# Patient Record
Sex: FEMALE | Race: WHITE | NOT HISPANIC OR LATINO | Employment: OTHER | URBAN - METROPOLITAN AREA
[De-identification: names, ages, dates, MRNs, and addresses within clinical notes are randomized per-mention and may not be internally consistent; named-entity substitution may affect disease eponyms.]

---

## 2017-05-13 ENCOUNTER — HOSPITAL ENCOUNTER (OUTPATIENT)
Dept: RADIOLOGY | Facility: MEDICAL CENTER | Age: 52
End: 2017-05-13
Attending: INTERNAL MEDICINE
Payer: MEDICAID

## 2017-05-13 DIAGNOSIS — R17 JAUNDICE, UNSPECIFIED, NOT OF NEWBORN: ICD-10-CM

## 2017-05-13 PROCEDURE — 700117 HCHG RX CONTRAST REV CODE 255: Performed by: INTERNAL MEDICINE

## 2017-05-13 PROCEDURE — 74177 CT ABD & PELVIS W/CONTRAST: CPT

## 2017-05-13 RX ADMIN — IOHEXOL 100 ML: 350 INJECTION, SOLUTION INTRAVENOUS at 10:18

## 2017-05-30 ENCOUNTER — HOSPITAL ENCOUNTER (OUTPATIENT)
Dept: RADIOLOGY | Facility: MEDICAL CENTER | Age: 52
End: 2017-05-30
Attending: INTERNAL MEDICINE
Payer: MEDICAID

## 2017-05-30 DIAGNOSIS — R17 JAUNDICE, UNSPECIFIED, NOT OF NEWBORN: ICD-10-CM

## 2017-05-30 DIAGNOSIS — R93.89 ABNORMAL CAT SCAN: ICD-10-CM

## 2017-05-30 PROCEDURE — 74183 MRI ABD W/O CNTR FLWD CNTR: CPT

## 2017-05-30 PROCEDURE — A9579 GAD-BASE MR CONTRAST NOS,1ML: HCPCS | Performed by: INTERNAL MEDICINE

## 2017-05-30 PROCEDURE — 700117 HCHG RX CONTRAST REV CODE 255: Performed by: INTERNAL MEDICINE

## 2017-05-30 RX ADMIN — GADODIAMIDE 13 ML: 287 INJECTION INTRAVENOUS at 15:50

## 2017-07-13 ENCOUNTER — HOSPITAL ENCOUNTER (OUTPATIENT)
Dept: LAB | Facility: MEDICAL CENTER | Age: 52
End: 2017-07-13
Attending: INTERNAL MEDICINE
Payer: MEDICAID

## 2017-07-13 PROCEDURE — 87799 DETECT AGENT NOS DNA QUANT: CPT

## 2017-07-13 PROCEDURE — 36415 COLL VENOUS BLD VENIPUNCTURE: CPT

## 2017-07-18 LAB — MISCELLANEOUS LAB RESULT MISCLAB: NORMAL

## 2018-01-20 ENCOUNTER — HOSPITAL ENCOUNTER (OUTPATIENT)
Dept: RADIOLOGY | Facility: MEDICAL CENTER | Age: 53
End: 2018-01-20
Attending: INTERNAL MEDICINE
Payer: MEDICAID

## 2018-01-20 DIAGNOSIS — K75.4 CHRONIC AGGRESSIVE HEPATITIS (HCC): ICD-10-CM

## 2018-01-20 PROCEDURE — 76705 ECHO EXAM OF ABDOMEN: CPT

## 2019-02-18 ENCOUNTER — APPOINTMENT (OUTPATIENT)
Dept: RADIOLOGY | Facility: IMAGING CENTER | Age: 54
End: 2019-02-18
Attending: NURSE PRACTITIONER
Payer: MEDICAID

## 2019-02-18 ENCOUNTER — OFFICE VISIT (OUTPATIENT)
Dept: URGENT CARE | Facility: CLINIC | Age: 54
End: 2019-02-18
Payer: MEDICAID

## 2019-02-18 VITALS
HEIGHT: 64 IN | TEMPERATURE: 98.3 F | SYSTOLIC BLOOD PRESSURE: 100 MMHG | OXYGEN SATURATION: 95 % | BODY MASS INDEX: 20.08 KG/M2 | HEART RATE: 91 BPM | WEIGHT: 117.6 LBS | RESPIRATION RATE: 14 BRPM | DIASTOLIC BLOOD PRESSURE: 62 MMHG

## 2019-02-18 DIAGNOSIS — S52.522A: Primary | ICD-10-CM

## 2019-02-18 DIAGNOSIS — S63.502A SPRAIN OF LEFT WRIST, INITIAL ENCOUNTER: ICD-10-CM

## 2019-02-18 PROCEDURE — 99203 OFFICE O/P NEW LOW 30 MIN: CPT | Performed by: NURSE PRACTITIONER

## 2019-02-18 PROCEDURE — 73110 X-RAY EXAM OF WRIST: CPT | Mod: TC,LT | Performed by: NURSE PRACTITIONER

## 2019-02-18 RX ORDER — OMEPRAZOLE 20 MG/1
20 CAPSULE, DELAYED RELEASE ORAL DAILY
COMMUNITY

## 2019-02-18 RX ORDER — MYCOPHENOLATE MOFETIL 500 MG/1
500 TABLET ORAL 2 TIMES DAILY
COMMUNITY
End: 2020-10-26

## 2019-02-18 RX ORDER — URSODIOL 300 MG/1
300 CAPSULE ORAL 2 TIMES DAILY
COMMUNITY

## 2019-02-18 RX ORDER — SODIUM PHOSPHATE,MONO-DIBASIC 19G-7G/118
500 ENEMA (ML) RECTAL
COMMUNITY
End: 2020-03-25

## 2019-02-18 ASSESSMENT — ENCOUNTER SYMPTOMS
WEAKNESS: 0
TINGLING: 0
NUMBNESS: 0
DIZZINESS: 0
PSYCHIATRIC NEGATIVE: 1
CONSTITUTIONAL NEGATIVE: 1
RESPIRATORY NEGATIVE: 1
MUSCLE WEAKNESS: 0
CARDIOVASCULAR NEGATIVE: 1
SENSORY CHANGE: 0
SHORTNESS OF BREATH: 0
NEUROLOGICAL NEGATIVE: 1
MUSCULOSKELETAL NEGATIVE: 1
FOCAL WEAKNESS: 0
GASTROINTESTINAL NEGATIVE: 1
EYES NEGATIVE: 1

## 2019-02-19 NOTE — PROGRESS NOTES
"Subjective:     Luanne JOSE is a 53 y.o. female who presents for Wrist Injury (fall today LT wrist injury)       Wrist Injury    Incident onset: earlier this morning. Incident location: outdoors. The pain is present in the left wrist. The quality of the pain is described as aching. The pain is moderate. Pertinent negatives include no chest pain, muscle weakness, numbness or tingling. The symptoms are aggravated by movement. She has tried acetaminophen for the symptoms.   Pt was walking dog when she fell backwards onto her left wrist on the snow.    PMH:  has no past medical history on file.    MEDS:   Current Outpatient Prescriptions:   •  mycophenolate (CELLCEPT) 500 MG tablet, Take 500 mg by mouth 2 times a day., Disp: , Rfl:   •  omeprazole (PRILOSEC) 20 MG delayed-release capsule, Take 20 mg by mouth every day., Disp: , Rfl:   •  Mesalamine (APRISO PO), Take  by mouth., Disp: , Rfl:   •  ursodiol (ACTIGALL) 300 MG Cap, Take 300 mg by mouth 2 times a day., Disp: , Rfl:   •  Probiotic Product (PROBIOTIC DAILY PO), Take  by mouth., Disp: , Rfl:   •  glucosamine Sulfate 500 MG Cap, Take 500 mg by mouth 3 times a day, with meals., Disp: , Rfl:   •  potassium citrate SR (UROCIT-K SR) 10 MEQ (1080 MG) TBCR, Take 20 mEq by mouth 2 Times a Day.  , Disp: , Rfl:   •  drospirenone-ethinyl estradiol (LASHAWN) 3-0.03 MG per tablet, Take 1 Tab by mouth every day.  , Disp: , Rfl:   •  NON SPECIFIED, Take 1 Tab by mouth every day. \"ESTROGEN MAX STRENGTH.\" , Disp: , Rfl:   •  MULTIPLE VITAMIN PO, Take 1 Tab by mouth every day.  , Disp: , Rfl:   •  BIOTIN, Take 500 mg by mouth every day.  , Disp: , Rfl:     ALLERGIES: No Known Allergies    SURGHX: No past surgical history on file.    SOCHX:  reports that she has never smoked. She has never used smokeless tobacco.     FH: Reviewed with patient, not pertinent to this visit.     Review of Systems   Constitutional: Negative.  Negative for malaise/fatigue.   HENT: Negative.    Eyes: " "Negative.    Respiratory: Negative.  Negative for shortness of breath.    Cardiovascular: Negative.  Negative for chest pain.   Gastrointestinal: Negative.    Genitourinary: Negative.    Musculoskeletal: Negative.         Left wrist pain   Skin: Negative.  Negative for rash.   Neurological: Negative.  Negative for dizziness, tingling, sensory change, focal weakness, weakness and numbness.   Psychiatric/Behavioral: Negative.    All other systems reviewed and are negative.    Objective:     /62 (BP Location: Right arm, Patient Position: Sitting, BP Cuff Size: Adult)   Pulse 91   Temp 36.8 °C (98.3 °F)   Resp 14   Ht 1.626 m (5' 4\")   Wt 53.3 kg (117 lb 9.6 oz)   SpO2 95%   BMI 20.19 kg/m²     Physical Exam   Constitutional: She is oriented to person, place, and time. She appears well-developed and well-nourished. She is cooperative. No distress.   HENT:   Head: Normocephalic.   Right Ear: External ear normal.   Left Ear: External ear normal.   Nose: Nose normal.   Eyes: Conjunctivae, EOM and lids are normal.   Neck: Normal range of motion.   Cardiovascular: Normal rate, regular rhythm, normal heart sounds and normal pulses.    Pulmonary/Chest: Effort normal and breath sounds normal. No respiratory distress.   Abdominal: Soft. Bowel sounds are normal.   Musculoskeletal: She exhibits no deformity.        Left elbow: Normal. She exhibits normal range of motion. No tenderness found.        Left wrist: She exhibits decreased range of motion (due to pain), tenderness and swelling. She exhibits no deformity and no laceration.        Left forearm: Normal. She exhibits no tenderness.   Neurological: She is alert and oriented to person, place, and time. She has normal strength. No sensory deficit.   Skin: Skin is warm, dry and intact. Capillary refill takes less than 2 seconds.   Psychiatric: She has a normal mood and affect.   Vitals reviewed.    X-ray left wrist:    Narrative     2/18/2019 7:07 " PM    HISTORY/REASON FOR EXAM: Left wrist pain after fall on ice this a.m.      TECHNIQUE/EXAM DESCRIPTION AND NUMBER OF VIEWS:  4 views of the LEFT wrist.    COMPARISON: None    FINDINGS:  There is an acute appearing nondisplaced transverse fracture of the distal metaphysis of the left radius.  No articular communication is identified.  No carpal fracture or dislocation is present.  The navicular appears intact.  If navicular pain is present and persists reevaluation in 7 to 10 days is recommended.     Impression     Nondisplaced transverse fracture distal left radial metaphysis.     Reading Provider Reading Date   Rigoberto Shine M.D. Feb 18, 2019        Assessment/Plan:     1. Traumatic closed nondisplaced torus fracture of distal radial metaphysis, left, initial encounter  - DX-WRIST-COMPLETE 3+ LEFT; Future  - REFERRAL TO SPORTS MEDICINE    Discussed RICE and acetaminophen PRN for pain. Pt unable to take NSAIDs due to medical issue. Wrist splint provided for left wrist. Referral given for sports medicine.    Patient advised to: Return for 1) Symptoms don't improve or worsen, or go to ER, 2) Follow up with primary care in 7-10 days.    Differential diagnosis, natural history, supportive care, and indications for immediate follow-up discussed. All questions answered. Patient agrees with the plan of care.

## 2019-02-20 ENCOUNTER — APPOINTMENT (OUTPATIENT)
Dept: RADIOLOGY | Facility: MEDICAL CENTER | Age: 54
End: 2019-02-20
Attending: INTERNAL MEDICINE

## 2019-02-28 DIAGNOSIS — S52.522A: Primary | ICD-10-CM

## 2020-02-28 ENCOUNTER — TELEPHONE (OUTPATIENT)
Dept: SURGERY | Facility: MEDICAL CENTER | Age: 55
End: 2020-02-28

## 2020-02-28 DIAGNOSIS — K75.4 AUTOIMMUNE HEPATITIS (HCC): ICD-10-CM

## 2020-03-04 ENCOUNTER — APPOINTMENT (OUTPATIENT)
Dept: TRANSPLANT | Facility: MEDICAL CENTER | Age: 55
End: 2020-03-04
Payer: MEDICAID

## 2020-03-25 ENCOUNTER — TELEMEDICINE2 (OUTPATIENT)
Dept: TRANSPLANT | Facility: MEDICAL CENTER | Age: 55
End: 2020-03-25
Payer: MEDICAID

## 2020-03-25 ENCOUNTER — TELEPHONE (OUTPATIENT)
Dept: TRANSPLANT | Facility: MEDICAL CENTER | Age: 55
End: 2020-03-25

## 2020-03-25 DIAGNOSIS — K90.0 CELIAC DISEASE: ICD-10-CM

## 2020-03-25 DIAGNOSIS — K59.1 FUNCTIONAL DIARRHEA: ICD-10-CM

## 2020-03-25 DIAGNOSIS — D84.9 IMMUNOSUPPRESSION (HCC): ICD-10-CM

## 2020-03-25 DIAGNOSIS — K75.4 AUTOIMMUNE HEPATITIS (HCC): ICD-10-CM

## 2020-03-25 PROBLEM — K21.9 GASTROESOPHAGEAL REFLUX DISEASE: Status: ACTIVE | Noted: 2020-03-25

## 2020-03-25 PROBLEM — M25.549 ARTHRALGIA OF HAND: Status: ACTIVE | Noted: 2019-02-08

## 2020-03-25 PROCEDURE — 99423 OL DIG E/M SVC 21+ MIN: CPT | Mod: 95,CR | Performed by: INTERNAL MEDICINE

## 2020-03-25 RX ORDER — BIOTIN 1000 MCG
500 TABLET,CHEWABLE ORAL
COMMUNITY
End: 2020-03-25

## 2020-03-25 RX ORDER — BUDESONIDE 3 MG/1
CAPSULE, COATED PELLETS ORAL
COMMUNITY
End: 2020-03-25

## 2020-03-25 RX ORDER — URSODIOL 300 MG/1
CAPSULE ORAL
COMMUNITY
End: 2020-03-25

## 2020-03-25 RX ORDER — MAGNESIUM OXIDE 400 MG/1
250 TABLET ORAL DAILY
COMMUNITY
End: 2020-10-26

## 2020-03-25 RX ORDER — OMEPRAZOLE 20 MG/1
CAPSULE, DELAYED RELEASE ORAL
COMMUNITY
End: 2020-03-25

## 2020-03-25 RX ORDER — MESALAMINE 400 MG/1
CAPSULE, DELAYED RELEASE ORAL
COMMUNITY
Start: 2020-03-23

## 2020-03-25 RX ORDER — MESALAMINE 375 MG/1
CAPSULE, EXTENDED RELEASE ORAL
COMMUNITY
End: 2020-03-25

## 2020-03-25 RX ORDER — MESALAMINE 0.38 G/1
CAPSULE, EXTENDED RELEASE ORAL
COMMUNITY
Start: 2020-01-11 | End: 2020-03-25

## 2020-03-25 RX ORDER — PREDNISONE 10 MG/1
10 TABLET ORAL DAILY
COMMUNITY
End: 2020-03-25

## 2020-03-25 RX ORDER — MYCOPHENOLATE MOFETIL 500 MG/1
TABLET ORAL
COMMUNITY
End: 2020-03-25

## 2020-03-25 RX ORDER — POTASSIUM CITRATE 10 MEQ/1
TABLET, EXTENDED RELEASE ORAL
COMMUNITY
Start: 2018-01-18

## 2020-03-25 SDOH — HEALTH STABILITY: MENTAL HEALTH: HOW OFTEN DO YOU HAVE A DRINK CONTAINING ALCOHOL?: NEVER

## 2020-03-25 NOTE — TELEPHONE ENCOUNTER
Pt appointment changed from in person to telemedicine via zoom. Verbal consent was given by the patient to perform clinic in this manner. Email with zoom meeting number emailed to the pt.     Meeting ID: [phone]

## 2020-03-25 NOTE — PROGRESS NOTES
"Pt identification was obtained at beginning of visit    [facility].   Telemedicine visit    This is a new visit for the patient to our [facility]    She has previously been seen in [place] [facility] by Dr Red Noe. She was last seen by him 2019, and initially seen by Dr. Rockwell in       She is a 54 anita, with a hx of AIH and lymphocytic colitis  dx'd 2017 by clinical presentation when she presented with jaundice and transaminitis and liver biopsy and steroid responsiveness - with ALT from 1000 to 100 and TB from 12 to 2 over 6 week period  Pathology reviewed by Dr. Luli snider for lymphocytic portal infiltrates and extensive lobular hepatitis    Serology negative - SMA neg, LKM, SLA, LCA, ANCA and IGG neg/normal    started initially on Imuran and budesonide -   Imuran changed to Cellcept   Cellcept:  2017  1000mg in am and 500mg in pm  2018  1000mg BID  2018 - 1500mg BID   2020 - 1000mg BID    2017 -2017 prednisone  2017 10/2018 budesonide       PMH  AIH  Lymphocytic colitis  Celiac dz. - gluten free diet since 6th grade - very stable weight     GERD  Chronic fatigue      PSH  appy  cesection    NKDA     Soc  No TOB, NO ETOH    13 ya Son lives with her part time  3 children  Trained as a  - not working currently      Current complaints include:  Chronic fatigue  Chronic diarrhea -  - but symptoms are such that she has loose stools in the am and then ok the rest of the day, she does not have weight loss or malabsorptive symptoms  Brain fog: Tried to return to work as a  but had difficulty with this due to \"brain fog\" and difficulty multitasking - she was unable to do the work   Subjective Leg swelling  - without weight gain  Headaches  Joint pain  - finger/knees/hip - refer to rheum  Depressed mood -     meds;  cellcept 1g BID  Maritza 600mg am and 300mg pm  Mesalamine 4 tabs daily  Potassium citrate    FAm hx  MGM bladder ca  MGF " "prostate cancer  Mother with hx of thyroid dx  Father - pre cancer - skin  Sister - skin cancer - 20ya    Vitals - unable to obtain due to this being a telehealth visit  GEN:  appears well, in no apparent distress, pleasant and cooperative  HEENT:  EOMI, conjunctiva clear  RESP: breathing comfortably, speaking in full sentences, no tachypnea  NEURO: alert and oriented, CN grossly intact, moves upper extremities appropriately  SKIN - normal appearance  PSYCH:  appropriate affect, intact thought and speech    Labs 3/2020  WBC 6.0, Hgb 12.6, Plt 253  TB 0.4, AST 26, ALT 23, AP 66, Alb 4.1, TP 6.4    1/2020  TB 0.5, AST 25, ALT 24, AP 69, Alb 4.5, TP 6.9    10/2019  TB 0.4, AST 39, ALT 42, AP 80, TP 7.1    ABD US 10/2019  C/w cirrhosis small nodular liver no e/o portal HTN    Impression:   54 anita, hx of AIH with likely advanced fibrosis as suggestive on imaging, but no signs of chronic liver disease on exam or by labs - nml Plt, alb and TB. Excellent control of AIH by liver enzymes.     Her primary complaint is fatigue and mental fogginess, and her frustration and event some depression due to her chronic medical condition.     Discussed at length her current disease control, plan for consideration of immunosuppression taper in the future, the complex nature of her complaints that likely have multiple contributing factors  - her fatigue and \"foggy brain\" cannot all be attributed to well controlled liver disease, but she should also look into treatment for depression and anxiety which are also likely playing a role in this presentation.       PCP for DEXA scan   PCP for neurocognitive testing if pt wishes  Referral to mental health for depression and anxiety  Recommended increase activity and exercise    AIH:   Would consider decreasing the cellcept to 750mg BID in 3 months   Would consider need biopsy before complete w/d of immunosuppression  Continue hepatoma screening with AFP and US q 6 months    Continue labs q " 3months  F/u in in clinic or telehealth in 3 months     This visit was conducted via Zoom Meetings as a telehealth visit.   I spent 60min face to face with the patient reviewing her history, discussing natural history of the disease and plan of care. I spent 15min reviewing the chart piror to the visit.       Brisa Cortez M.D.      PCP [facility]

## 2020-10-26 VITALS — WEIGHT: 125 LBS | BODY MASS INDEX: 21.46 KG/M2

## 2020-10-26 RX ORDER — MYCOPHENOLATE MOFETIL 250 MG/1
750 CAPSULE ORAL 2 TIMES DAILY
COMMUNITY

## 2020-10-28 ENCOUNTER — TELEMEDICINE2 (OUTPATIENT)
Dept: TRANSPLANT | Facility: MEDICAL CENTER | Age: 55
End: 2020-10-28
Payer: MEDICAID

## 2020-10-28 NOTE — PROGRESS NOTES
"Pt identification was obtained at beginning of visit    [facility].   Telemedicine visit    This is a follow up visit for the patient to our [facility]    She has previously been seen in [place] [facility] by Dr Rde Noe. She was last seen by him 2019, and initially seen by Dr. Rockwell in 2017    Pt continues to have what she describes as  \"diarrhea\" - which she has had for 1 year-   she tells me her primary GI increased her budesonide to 9mg daily with no improvement   Also increased delcozole  Averages 1 daily BM - not truly diarrhea - just a loose stool -   No abdominal discomfort or weight loss  Has noted abdominal bloating intermittently  Also has new feelings of what she describes as neuropathy - pins and needles, tingling, and at times burning - has had these symptoms in the past which resolved on it's own.   She notices it is worse in the evening, at the end of the day when she is resting. Or when she is seated for a long period - notices in calves and knees -   Continues to have tinitus    She is a 54 anita, with a hx of AIH and lymphocytic colitis  dx'd 2017 by clinical presentation when she presented with jaundice and transaminitis and liver biopsy and steroid responsiveness - with ALT from 1000 to 100 and TB from 12 to 2 over 6 week period  Pathology reviewed by Dr. Luli snider for lymphocytic portal infiltrates and extensive lobular hepatitis    Serology negative - SMA neg, LKM, SLA, LCA, ANCA and IGG neg/normal    started initially on Imuran and budesonide -   Imuran changed to Cellcept   Cellcept:  2017  1000mg in am and 500mg in pm  2018  1000mg BID  2018 - 1500mg BID   2020 - 1000mg BID  2020 - 750mg BID      2017 -2017 prednisone  2017 10/2018 budesonide       PMH  AIH  Lymphocytic colitis  Celiac dz. - gluten free diet since 6th grade - very stable weight     GERD  Chronic fatigue      PSH  appy  cesection    NKDA     Soc  No TOB, NO " "ETOH    13 ya Son lives with her part time  3 children  Trained as a  - not working currently      Current complaints include:  Chronic fatigue  Chronic diarrhea -  - but symptoms are such that she has loose stools in the am and then ok the rest of the day, she does not have weight loss or malabsorptive symptoms  Brain fog: Tried to return to work as a  but had difficulty with this due to \"brain fog\" and difficulty multitasking - she was unable to do the work   Subjective Leg swelling  - without weight gain  Headaches  Joint pain  - finger/knees/hip - refer to rheum  Depressed mood -     meds;  cellcept 1g BID  Maritza 600mg am and 300mg pm  Mesalamine 4 tabs daily  Potassium citrate    FAm hx  MGM bladder ca  MGF prostate cancer  Mother with hx of thyroid dx  Father - pre cancer - skin  Sister - skin cancer - 20ya    Vitals - unable to obtain due to this being a telehealth visit  GEN:  appears well, in no apparent distress, pleasant and cooperative  HEENT:  EOMI, conjunctiva clear  RESP: breathing comfortably, speaking in full sentences, no tachypnea  NEURO: alert and oriented, CN grossly intact, moves upper extremities appropriately  SKIN - normal appearance  PSYCH:  appropriate affect, intact thought and speech      Labs  10/2020  Na 141, BUN 13, Cr 0.6  TB 0.5, AST 31, ALT 39, AP 60, Alb 4.3, TP 7.0    7/2020  AST 28, ALT 26     3/2020  WBC 6.0, Hgb 12.6, Plt 253  TB 0.4, AST 26, ALT 23, AP 66, Alb 4.1, TP 6.4    1/2020  TB 0.5, AST 25, ALT 24, AP 69, Alb 4.5, TP 6.9    10/2019  TB 0.4, AST 39, ALT 42, AP 80, TP 7.1      ABD US 9/22/20  OSH - will get results    ABD US 10/2019  C/w cirrhosis small nodular liver no e/o portal HTN    Impression:   54 anita, hx of AIH with likely advanced fibrosis as suggestive on imaging, but no signs of chronic liver disease on exam or by labs - nml Plt, alb and TB. Excellent control of AIH by liver enzymes.     Discussed her liver enzymes are slightly " "increased  - with the only difference being decrease in cellcept 750mg BID -   Discussed     Discussed at length her current disease control, plan for consideration of immunosuppression taper in the future, the complex nature of her complaints that likely have multiple contributing factors  - her fatigue and \"foggy brain\" cannot all be attributed to well controlled liver disease, but she should also look into treatment for depression and anxiety which are also likely playing a role in this presentation.       PCP for DEXA scan   PCP for neurocognitive testing if pt wishes  Referral to mental health for depression and anxiety  Recommended increase activity and exercise  W/o neuropathy     AIH:   Would consider decreasing the cellcept to 750mg BID in 3 months   Would consider need biopsy before complete w/d of immunosuppression  Continue hepatoma screening with AFP and US q 6 months    Continue labs q 3months  F/u in in clinic or telehealth in 3 months     This visit was conducted via Zoom Meetings as a telehealth visit.   I spent 60min face to face with the patient reviewing her history, discussing natural history of the disease and plan of care. I spent 15min reviewing the chart piror to the visit.       Brisa Cortez M.D.      PCP [facility]ra[facility]    "

## 2021-04-14 ENCOUNTER — OFFICE VISIT (OUTPATIENT)
Dept: NEUROLOGY | Facility: MEDICAL CENTER | Age: 56
End: 2021-04-14
Attending: PSYCHIATRY & NEUROLOGY
Payer: MEDICAID

## 2021-04-14 VITALS
HEIGHT: 64 IN | WEIGHT: 140 LBS | DIASTOLIC BLOOD PRESSURE: 62 MMHG | TEMPERATURE: 98.1 F | OXYGEN SATURATION: 94 % | SYSTOLIC BLOOD PRESSURE: 120 MMHG | HEART RATE: 73 BPM | BODY MASS INDEX: 23.9 KG/M2

## 2021-04-14 DIAGNOSIS — Z00.00 NORMAL NEUROLOGICAL EXAM: ICD-10-CM

## 2021-04-14 PROCEDURE — 99204 OFFICE O/P NEW MOD 45 MIN: CPT | Performed by: PSYCHIATRY & NEUROLOGY

## 2021-04-14 RX ORDER — GABAPENTIN 300 MG/1
CAPSULE ORAL
Status: ON HOLD | COMMUNITY
Start: 2021-03-18 | End: 2023-02-10

## 2021-04-14 RX ORDER — MYCOPHENOLATE MOFETIL 500 MG/1
1000 TABLET ORAL
COMMUNITY
Start: 2021-03-12

## 2021-04-14 ASSESSMENT — PATIENT HEALTH QUESTIONNAIRE - PHQ9: CLINICAL INTERPRETATION OF PHQ2 SCORE: 0

## 2021-04-14 NOTE — PROGRESS NOTES
"Reason for Consult:  Periodic Sensory Disturbances of the mid chins     History of present illness:    Luanne JOSE 55 y.o. right handed woman with celiac disease since a young child (biopsy of her intestine positive in the 1970s), autoimmune hepatitis onset over 5 years ago and on CellCept ( 1000 mg PO BID- 2000 mg a day)> this dose was reduced about 3 months when previously on 1500 mg PO BID (3000 mg).    She has noticed for the last year (since March 2020) or so to have a periodic painless paraesthesias starting symmetrically or nearly so in the mid forehead (from 3 inches below the knees to above the ankles) and completely sparing the feet,toes and upper limbs proper. The area of concern was only and primarily in the anterior chin regions and always in that area.    She showed me the areas on her chins where the sensory disturbance(s) were located and it seemed to be a \"near line\" of sensory disturbance(s) in the mid chin areas definitely sparing the ankle region and below that area as well as being atleast 4 inches below the knees (patellar areas).  If she walks there is no effect or improvement/exacerbation with walking or bending.  There has been no involvement of the hands-fingers in the last 6 months.  If she wiggles her toes there is and has been no numbness or sensory disturbances of her feet (toes) in any location.  Even today, her toes all feel fine whether moving them or not.    Lunane has not features of Restless Leg (or Arm) syndrome nor any evolving gait-balance issues in the last 3-6 months.    She has not had any muscle cramping,spasms,stiffness or tightness of the lower legs in the last 3 to 6 months.    She has not had any dysarthria,dysphagia,visual disturbances (blurring,double vision),vertiginous events, involuntary movements of the limbs or body in the last 3 months or so.    Weight and appetite has been stable in the last few months.    NCS done 2/17/2021-> I reviewed the results and " they look normal (robust sural and ulnar sensory responses seen). Bilateral upper arms (EMG) and Right lower leg EMG- unremarkable.(done by GALA GOMEZ).        Patient Active Problem List    Diagnosis Date Noted   • Gastroesophageal reflux disease 03/25/2020   • Arthralgia of hand 02/08/2019   • Autoimmune hepatitis (HCC) 09/20/2017   • Diarrhea 08/24/2016   • Celiac disease 03/09/2016   • Murmur 01/10/2013       Past medical history:   Past Medical History:   Diagnosis Date   • Autoimmune hepatitis (HCC) 2017   • Celiac disease 1980   • Kidney stones 2005   • Ulcerative colitis (HCC) 2017       Past surgical history:   Past Surgical History:   Procedure Laterality Date   • APPENDECTOMY     • PRIMARY C SECTION  2003, 2007         Social history:   Social History     Socioeconomic History   • Marital status:      Spouse name: Not on file   • Number of children: Not on file   • Years of education: Not on file   • Highest education level: Not on file   Occupational History   • Not on file   Tobacco Use   • Smoking status: Never Smoker   • Smokeless tobacco: Never Used   Substance and Sexual Activity   • Alcohol use: Never   • Drug use: Never   • Sexual activity: Not Currently   Other Topics Concern   • Not on file   Social History Narrative   • Not on file     Social Determinants of Health     Financial Resource Strain:    • Difficulty of Paying Living Expenses:    Food Insecurity:    • Worried About Running Out of Food in the Last Year:    • Ran Out of Food in the Last Year:    Transportation Needs:    • Lack of Transportation (Medical):    • Lack of Transportation (Non-Medical):    Physical Activity:    • Days of Exercise per Week:    • Minutes of Exercise per Session:    Stress:    • Feeling of Stress :    Social Connections:    • Frequency of Communication with Friends and Family:    • Frequency of Social Gatherings with Friends and Family:    • Attends [employer] Services:    • Active Member of Clubs or  Organizations:    • Attends Club or Organization Meetings:    • Marital Status:    Intimate Partner Violence:    • Fear of Current or Ex-Partner:    • Emotionally Abused:    • Physically Abused:    • Sexually Abused:        Family history:   Family History   Problem Relation Age of Onset   • Glaucoma Mother    • Lung Disease Mother    • Heart Disease Maternal Grandfather    • Cancer Paternal Grandmother    • Cancer Paternal Grandfather          Current medications:   Current Outpatient Medications   Medication   • Magnesium Oxide 250 MG Tab   • mycophenolate (CELLCEPT) 250 MG Cap   • mesalamine delayed-release (DELZICOL) 400 MG CAPSULE DELAYED RELEASE   • potassium citrate SR (UROCIT-K SR) 10 MEQ (1080 MG) Tab CR   • omeprazole (PRILOSEC) 20 MG delayed-release capsule   • ursodiol (ACTIGALL) 300 MG Cap   • MULTIPLE VITAMIN PO   • gabapentin (NEURONTIN) 300 MG Cap   • mycophenolate (CELLCEPT) 500 MG tablet     No current facility-administered medications for this visit.       Medication Allergy:  No Known Allergies          ROS:  (In the last 2-4 weeks unless otherwise stated for an additional period of time).    Constitutional: Denies fevers,chills,sweats,involuntary and unprovoked/progressive  weight loss.  Eyes: Denies changes in vision (blurring,double or loss of) nor any eye pain(s)- static,evolving or with eye movements.  Ears/Nose/Throat/Mouth: Denies nasal congestion,sore throat,ear pain(s) or changes in hearing ability.  No tinnitus.  Cardiovascular: Denies chest pain/pressure at rest or with exertion such as climbing stairs or walking. No  palpitations. There has been no  orthostatic lightheadedness/faintness events.  Respiratory: Denies SOB with exertion or when sleeping (while laying down).  Gastrointestinal/Hepatic: Denies abdominal pain, nausea, vomiting, diarrhea, constipation or GI bleeding   Genitourinary: Denies bladder dysfunction, dysuria or frequency   Musculoskeletal/Rheum: Denies joint pain and  "swelling   Skin/Breast: Denies rash, denies breast lumps or discharge   Neurological: Denies new or evolving headaches, new or evolving confusion/disorientation, seizure like events, or focal weakness/parasthesias.  There has been no falls or near falls.  Psychiatric: Denies feeling anxious,depressed,suicidal or having auditory/visual hallucinations.  Endocrine: Denies hx of diabetes or thyroid dysfunction   Heme/Oncology/ Denies easy or spontaneous bruising/bleeding from nose,skin  or a known bleeding disorder   Allergic/Immunologic: Denies hx of allergies           Physical examination:   Vitals:    04/14/21 1350   BP: 120/62   BP Location: Right arm   Patient Position: Sitting   BP Cuff Size: Adult   Pulse: 73   Temp: 36.7 °C (98.1 °F)   TempSrc: Temporal   SpO2: 94%   Weight: 63.5 kg (140 lb)   Height: 1.626 m (5' 4\")       Normal Cephalic atraumatic.  General: Full Range of Movement around the Neck in all directions without restrictions or discrete pain evoked triggers.  No lower extremity edema.  No skin changes of the forelegs,feet or toes (feet are not red).        Neurological  Exam:        Mental status: Awake, alert and fully oriented to person, place, time and situation. Normal attention, concentration and fund of knowledge for education level.  Did not appear/act combative,irritable,anxious,paranoid/delusional or aggressive to or with me.  Speech and language: Speech is fluent without errors, intact to repetition, intact to reading, intact to writing and intact to naming.     Follows 3 step motor commands in sequence without significant delay and correctly.    Cranial nerve exam:  II: Pupils are equally round and reactive to light. Visual fields are intact by confrontation.  III, IV, VI: EOMI, no diplopia, no ptosis.  V: Sensation to light touch is normal over V1-3 distributions bilaterally.   and Jaw jerk is not present.  VII: Facial movements are symmetrical. There is no facial droop.  and Eye closure " strength is normal..  VIII: Hearing intact to soft speech and finger rub bilaterally  IX: Palate elevates symmetrically, uvula is midline. Dysarthria is not present.  XI: Shoulder shrug are symmetrical and strong.   XII: Tongue protrudes midline.  Recent and remote memory were normal  Attention span and concentration were normal  Language (naming, repetition, spontaneous speech) were normal      Motor exam:  Muscle tone is normal in all 4 limbs. and No abnormal movements appreciated.    Muscle strength:    There is no atrophy of the limbs x 4.        Neck Flexors/Extensors: 5/5       Right  Left  Deltoid   5/5  5/5      Biceps   5/5  5/5  Triceps  5/5  5/5   Wrist extensors 5/5  5/5  Wrist flexors  5/5  5/5     5/5  5/5  Interossei  5/5  5/5  Thenar (APB)  5/5  5/5   Hip flexors  5/5  5/5  Quadriceps  5/5  5/5    Hamstrings  5/5  5/5  Dorsiflexors  5/5  5/5  Plantarflexors  5/5  5/5  Toe extension  5/5  5/5    Toe flexors and Extensors: Bilaterally 5/5.    Sensory exam:  Intact to Temperature, Vibration and Proprioception in bilaterally lower extremity.    Vibratory threshold at great toes - 10 seconds at biopsy and 10-12 seconds at the ankles and 8-10 seconds at both knees.    No allodynia or hyperalgia.    Normal pin prick along entire areas of both legs and feet. There was no stock glove distribution of the lower extremities.    Touching her forelegs and feet with my finger(s)  felt normal to her.    Reflexes:       Right  Left  Biceps   2/4  2/4  Triceps  2/4  2/4  Brachioradialis 2/4  2/4  Knee jerk  2/4  2/4  Ankle jerk  2/4  2/4     Frontal release signs are absent,    bilaterally toes are downgoing to plantar stimulation..    Coordination (finger-to-nose, heel/knee/shin, rapid alternating movements) was normal.     There was no truncal ataxia, no tremors, and no dysmetria.     Station and gait - easily stands up from exam chair without retropulsion,veering,leaning,swaying (to either side).     Walks on  "toes and heels well for 10 feet.    Arm swing symmetrical.    No Rombergism.      Labs and Tests:  Testing done at [facility]  - reviewed.       Impression/Plans/Recommendations:    1. Distal Mid Chin area and predominant  sensory disturbances for over 1 year without evolving features of myelopathy, lumbar sacral radiculopathic symptoms or distal sensory or sensory-motor polyneuropathy.    I reviewed the reasonably done EMG-NCS from 2/2021 and this testing did not support any pathology. Her sural and ulnar sensory responses were normal.    There are also no clinical features of a  Focal or generalized small fiber neuropathy.    There are no features of RLS such as a need to move the limbs or an urge to move the lower legs or unusual sensations that occur when resting or primarily in the evening hours.    At this time I do not feel that Brain or Spinal Cord imaging will be useful nor would  A \"blind\" nerve biopsy or  a CSF exam as  we reviewed these type of testing \"tools.\"      The patient understands and agrees that due to the complexity of his/her diagnosis, results of any testing and further recommendations will typically be discussed/made during a face to face encounter in my office and for simpler  matters either by a phone call or email correspondence. The patient and/or family further understands it is their responsibility to keep proper follow up as needed and when suggested by me.      I have performed  a history and physical exam and a directed /focused  ROS today.    Total time spent today or this patient's care was 46  minutes   and included reviewing diagnostic workup to date that include interpreting such tests relevant to this patient's neurological condition) and  documenting clinical information in the EMR.    Follow up PRN at this time.    Josue Antony MD  [place] [facility] Medicine.   [facility]    "

## 2023-02-10 ENCOUNTER — HOSPITAL ENCOUNTER (OUTPATIENT)
Facility: MEDICAL CENTER | Age: 58
End: 2023-02-10
Attending: INTERNAL MEDICINE | Admitting: RADIOLOGY
Payer: MEDICAID

## 2023-02-10 ENCOUNTER — APPOINTMENT (OUTPATIENT)
Dept: RADIOLOGY | Facility: MEDICAL CENTER | Age: 58
End: 2023-02-10
Attending: INTERNAL MEDICINE
Payer: MEDICAID

## 2023-02-10 VITALS
BODY MASS INDEX: 24.75 KG/M2 | RESPIRATION RATE: 20 BRPM | DIASTOLIC BLOOD PRESSURE: 60 MMHG | HEIGHT: 64 IN | OXYGEN SATURATION: 96 % | SYSTOLIC BLOOD PRESSURE: 101 MMHG | WEIGHT: 145 LBS | HEART RATE: 76 BPM

## 2023-02-10 DIAGNOSIS — K74.69 OTHER CIRRHOSIS OF LIVER (HCC): ICD-10-CM

## 2023-02-10 DIAGNOSIS — K75.4 AUTOIMMUNE HEPATITIS (HCC): ICD-10-CM

## 2023-02-10 LAB
BASOPHILS # BLD AUTO: 1.7 % (ref 0–1.8)
BASOPHILS # BLD: 0.06 K/UL (ref 0–0.12)
EOSINOPHIL # BLD AUTO: 0.06 K/UL (ref 0–0.51)
EOSINOPHIL NFR BLD: 1.7 % (ref 0–6.9)
ERYTHROCYTE [DISTWIDTH] IN BLOOD BY AUTOMATED COUNT: 47.8 FL (ref 35.9–50)
HCT VFR BLD AUTO: 41 % (ref 37–47)
HGB BLD-MCNC: 13.4 G/DL (ref 12–16)
IMM GRANULOCYTES # BLD AUTO: 0.01 K/UL (ref 0–0.11)
IMM GRANULOCYTES NFR BLD AUTO: 0.3 % (ref 0–0.9)
INR PPP: 0.97 (ref 0.87–1.13)
LYMPHOCYTES # BLD AUTO: 1.21 K/UL (ref 1–4.8)
LYMPHOCYTES NFR BLD: 33.7 % (ref 22–41)
MCH RBC QN AUTO: 29.6 PG (ref 27–33)
MCHC RBC AUTO-ENTMCNC: 32.7 G/DL (ref 33.6–35)
MCV RBC AUTO: 90.7 FL (ref 81.4–97.8)
MONOCYTES # BLD AUTO: 0.45 K/UL (ref 0–0.85)
MONOCYTES NFR BLD AUTO: 12.5 % (ref 0–13.4)
NEUTROPHILS # BLD AUTO: 1.8 K/UL (ref 2–7.15)
NEUTROPHILS NFR BLD: 50.1 % (ref 44–72)
NRBC # BLD AUTO: 0 K/UL
NRBC BLD-RTO: 0 /100 WBC
PATHOLOGY CONSULT NOTE: NORMAL
PLATELET # BLD AUTO: 299 K/UL (ref 164–446)
PMV BLD AUTO: 9.1 FL (ref 9–12.9)
PROTHROMBIN TIME: 12.8 SEC (ref 12–14.6)
RBC # BLD AUTO: 4.52 M/UL (ref 4.2–5.4)
WBC # BLD AUTO: 3.6 K/UL (ref 4.8–10.8)

## 2023-02-10 PROCEDURE — 88307 TISSUE EXAM BY PATHOLOGIST: CPT

## 2023-02-10 PROCEDURE — 700111 HCHG RX REV CODE 636 W/ 250 OVERRIDE (IP)

## 2023-02-10 PROCEDURE — 85025 COMPLETE CBC W/AUTO DIFF WBC: CPT

## 2023-02-10 PROCEDURE — 76942 ECHO GUIDE FOR BIOPSY: CPT

## 2023-02-10 PROCEDURE — 47000 NEEDLE BIOPSY OF LIVER PERQ: CPT

## 2023-02-10 PROCEDURE — 88313 SPECIAL STAINS GROUP 2: CPT | Mod: 91

## 2023-02-10 PROCEDURE — 36415 COLL VENOUS BLD VENIPUNCTURE: CPT

## 2023-02-10 PROCEDURE — 4410014 IR-BIOPSY-LIVER PERCUTANEOUS(FL)

## 2023-02-10 PROCEDURE — 85610 PROTHROMBIN TIME: CPT

## 2023-02-10 RX ORDER — ONDANSETRON 2 MG/ML
4 INJECTION INTRAMUSCULAR; INTRAVENOUS EVERY 6 HOURS PRN
Status: DISCONTINUED | OUTPATIENT
Start: 2023-02-10 | End: 2023-02-10 | Stop reason: HOSPADM

## 2023-02-10 RX ORDER — MIDAZOLAM HYDROCHLORIDE 1 MG/ML
INJECTION INTRAMUSCULAR; INTRAVENOUS
Status: COMPLETED
Start: 2023-02-10 | End: 2023-02-10

## 2023-02-10 RX ORDER — MIDAZOLAM HYDROCHLORIDE 1 MG/ML
.5-2 INJECTION INTRAMUSCULAR; INTRAVENOUS PRN
Status: DISCONTINUED | OUTPATIENT
Start: 2023-02-10 | End: 2023-02-10 | Stop reason: HOSPADM

## 2023-02-10 RX ORDER — HYDROMORPHONE HYDROCHLORIDE 1 MG/ML
0.5 INJECTION, SOLUTION INTRAMUSCULAR; INTRAVENOUS; SUBCUTANEOUS
Status: DISCONTINUED | OUTPATIENT
Start: 2023-02-10 | End: 2023-02-10 | Stop reason: HOSPADM

## 2023-02-10 RX ORDER — OXYCODONE HYDROCHLORIDE 5 MG/1
5 TABLET ORAL
Status: DISCONTINUED | OUTPATIENT
Start: 2023-02-10 | End: 2023-02-10 | Stop reason: HOSPADM

## 2023-02-10 RX ORDER — SODIUM CHLORIDE 9 MG/ML
500 INJECTION, SOLUTION INTRAVENOUS
Status: DISCONTINUED | OUTPATIENT
Start: 2023-02-10 | End: 2023-02-10 | Stop reason: HOSPADM

## 2023-02-10 RX ADMIN — MIDAZOLAM HYDROCHLORIDE 1 MG: 1 INJECTION INTRAMUSCULAR; INTRAVENOUS at 08:36

## 2023-02-10 RX ADMIN — MIDAZOLAM HYDROCHLORIDE 1 MG: 1 INJECTION, SOLUTION INTRAMUSCULAR; INTRAVENOUS at 08:36

## 2023-02-10 RX ADMIN — FENTANYL CITRATE 50 MCG: 50 INJECTION, SOLUTION INTRAMUSCULAR; INTRAVENOUS at 08:36

## 2023-02-10 ASSESSMENT — PAIN DESCRIPTION - PAIN TYPE: TYPE: CHRONIC PAIN

## 2023-02-10 NOTE — OR SURGEON
Immediate Post- Operative Note        Findings: liver dysfunction      Procedure(s): USG liver biopsy      Estimated Blood Loss: Less than 5 ml        Complications: None            2/10/2023     8:46 AM     Kameron Stanley M.D.

## 2023-02-10 NOTE — H&P
History and Physical    Date: 2/10/2023    PCP: Yvonne Luz M.D.      CC: liver dysfunction    HPI: This is a 57 y.o. female who is presenting with above    Past Medical History:   Diagnosis Date    Autoimmune hepatitis (HCC) 2017    Celiac disease 1980    Kidney stones 2005    Ulcerative colitis (HCC) 2017       Past Surgical History:   Procedure Laterality Date    APPENDECTOMY      PRIMARY C SECTION  2003, 2007       No current facility-administered medications for this encounter.        Social History     Socioeconomic History    Marital status:      Spouse name: Not on file    Number of children: Not on file    Years of education: Not on file    Highest education level: Not on file   Occupational History    Not on file   Tobacco Use    Smoking status: Never    Smokeless tobacco: Never   Vaping Use    Vaping Use: Never used   Substance and Sexual Activity    Alcohol use: Never    Drug use: Never    Sexual activity: Not Currently   Other Topics Concern    Not on file   Social History Narrative    Not on file     Social Determinants of Health     Financial Resource Strain: Not on file   Food Insecurity: Not on file   Transportation Needs: Not on file   Physical Activity: Not on file   Stress: Not on file   Social Connections: Not on file   Intimate Partner Violence: Not on file   Housing Stability: Not on file       Family History   Problem Relation Age of Onset    Glaucoma Mother     Lung Disease Mother     Heart Disease Maternal Grandfather     Cancer Paternal Grandmother     Cancer Paternal Grandfather        Allergies:  Patient has no known allergies.    Review of Systems:  Negative     Physical Exam  Constitutional:       General: She is not in acute distress.     Appearance: She is well-developed. She is not diaphoretic.   HENT:      Head: Normocephalic and atraumatic.      Mouth/Throat:      Pharynx: No oropharyngeal exudate.   Eyes:      General: No scleral icterus.        Right eye: No  discharge.         Left eye: No discharge.   Cardiovascular:      Rate and Rhythm: Normal rate.   Pulmonary:      Effort: Pulmonary effort is normal.   Abdominal:      Palpations: Abdomen is soft.   Musculoskeletal:      Cervical back: Neck supple.   Skin:     General: Skin is warm and dry.   Neurological:      Mental Status: She is alert and oriented to person, place, and time.   Psychiatric:         Behavior: Behavior normal.         Thought Content: Thought content normal.         Judgment: Judgment normal.           Assessment and Plan:This is a 57 y.o. here for usg liver biopsy

## 2023-08-31 ENCOUNTER — APPOINTMENT (OUTPATIENT)
Dept: PHYSICAL THERAPY | Facility: MEDICAL CENTER | Age: 58
End: 2023-08-31
Attending: OTOLARYNGOLOGY
Payer: MEDICAID

## 2023-09-06 NOTE — OP THERAPY EVALUATION
"  Outpatient Physical Therapy  VESTIBULAR EVALUATION    [facility] Outpatient Physical Therapy  [address]  [facility] [address]  Phone:  [phone]  Fax:  [phone]    Date of Evaluation: 2023    Patient: Luanne Pizano  YOB: 1965  MRN: [id number]     Referring Provider: Олег Edward M.D.  [address] [place] [id number]   Referring Diagnosis: Otalgia, left ear [H92.02]     Time Calculation    Start time: 805  Stop time: 845 Time Calculation (min): 40 minutes           Chief Complaint: Loss Of Balance    Visit Diagnoses     ICD-10-CM   1. Otalgia, left ear  H92.02         History of Present Illness:     Mechanism of injury:    Luanne presents to PT with complaint of L ear pain and fullness. 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 Does not recall the ears feeling full prior to starting diving, but has always had a lot ear wax (L>R) that is cleared by her PCP. When she began diving beyond 10 feet, had trouble clearing her ears and now she is experiencing constant fullness in her ears. Finds it difficult to locate sounds. Chronic tinnitus bilaterally. Audiology exam was unremarkable. Denies jaw or neck pain. Does not believe she grinds her teeth \"but I might clench them.\" Denies dizziness or vertigo. Only occasional lightheadedness that she attributes to lightheadedness. Does state he was falling every other month, relates this to fatigue. \"I wasn't tripping more often, I just wasn't able to stop myself as well as I should be able to.\"    Prior level of function:  Self employed. Diving, walking, hiking, reading.    Sleep disturbance:  Not disrupted  Symptoms:     Current symptom ratin    At best symptom ratin    At worst symptom ratin    Progression:  Stable  Social Support:     Lives in:  Multiple-level home    Lives with:  Adult children  Treatments:     No prior treatments received    Patient goals:     " Other patient goals:  Reduce pressure in the L ear.      Past Medical History:   Diagnosis Date    Autoimmune hepatitis (HCC) 2017    Celiac disease 1980    Kidney stones 2005    Ulcerative colitis (HCC) 2017     Past Surgical History:   Procedure Laterality Date    APPENDECTOMY      PRIMARY C SECTION  2003, 2007     Social History     Tobacco Use    Smoking status: Never    Smokeless tobacco: Never   Substance Use Topics    Alcohol use: Never     Family and Occupational History     Socioeconomic History    Marital status:      Spouse name: Not on file    Number of children: Not on file    Years of education: Not on file    Highest education level: Not on file   Occupational History    Not on file       Objective:    Gait:     Comments: WNL  Vestibulospinal Exam:     Comments:   MiniBest:   STS= 2  Rise to toes= 2  Stand on 1 leg= 2  Fwd stepping correction= 2  Backward stepping correction= 2  Lateral stepping correction= 2  EO, feet together, firm= 2  Foam EC, feet together= 2  Incline EC= 2  Change in gait speed= 2  Walk with HT= 2  Walk with pivot turn= 2  Step over obstacle= 2  TUG= 2     Total= 28/28    Oculomotor Exam:         Details: No spontaneous nystagmus central gaze          Details: No spontaneous nystagmus eccentric gaze    No saccadic eye movements    Smooth pursuit present    Convergence:        Normal convergence    No skew  Active Range of Motion:     Within functional limits    Active range of motion comments: C/S AROM is full with no pain to overpressure    TMJ opening is 4 CM and deviation is symmetrical at 2 CM bilaterally. Equal excursion to palpation. Mild tenderness along bilateral masseters and subocc. Reports hx of tension headaches in the occiput, but none recent.  Limb Ataxia Exam:     Finger-to-Nose:         Intention tremor: none    Dysdiadochokinesia: none.  Strength Exam:     Upper extremities within functional limits    Lower extremities within functional limits     "Comments: DNF endurance test= 18 seconds  Reflex Exam:       Comments:   B C5= 2/4  Sensation Tests:     Comments:   Reports chronic tingling in the shins that has been assessed, but unable to ID source. No N/T in the feet. Tenderness (chronic) and B achilles insertions.   Vertebrobasilar Exam:    Comments:   NEG seated active screening    Vestibulo-Ocular Exam:   Normal head thrust test        Therapeutic Treatments and Modalities:     1. Manual Therapy (CPT 87270), Supine: DTW to B masseters, sub occ.  and UPAs throughout the c/s, focuing on the upper levels. L TMJ gapping. All mobs GIII-IV.    Time-based treatments/modalities:    Physical Therapy Timed Treatment Charges  Manual therapy minutes (CPT 07866): 10 minutes        Assessment:     Functional impairments:  Pain    Other impairments:  Sensation of L ear fullness.    Assessment details:    Luanne is a 57 y.o female who presents to PT with complaint of L ear fullness that began when she initiated diving training in September 2022. Audiology exam was unremarkable. She is planning to restart diving in October and is hoping to optimize her body to better adjust to the depths without causing more problems with her ears. PT evaluation was relatively unremarkable. Her vestibular function with VOR and VRS control was normal. She is not experiencing dizziness. Her cervical and TMJ ROM is WNL, but there is some tenderness in the supportive musculature of the jaw and suboccipitals. A trial of PT will be completed to address the muscular restriction and optimize postural control through the upper c/s and TMJ in hopes this will support improved clearing of the eustacian tube.     Prognosis: good    Goals:     Short term goals:  - See LTGs    Short term goal time span:  1-2 weeks    Long term goals:    - Improve DHI to 0%  - Pt demonstrates at least a 40\" DNF endurance test.   - Pt reports at least 50% reduction in the sensation of L ear fullness  - Pt demonstrates " independence with a HEP for continued management of this problem beyond discharge from therapy.       Long term goal time span:  6-8 weeks  Plan:     Therapy options:  Physical therapy treatment to continue    Planned therapy interventions:  Functional Training, Self Care (CPT 39189), Therapeutic Exercise (CPT 32702), Therapeutic Activities (CPT 36405), Neuromuscular Re-education (CPT 01595) and Manual Therapy (CPT 02055)    Frequency:  1x week    Duration in weeks:  8    Discussed with:  Patient      Functional Assessment Used  Dizziness Handicap Inventory - Physical Items Score: 2  Dizziness Handicap Inventory - Emotional Items Score: 4  Dizziness Handicap Inventory - Functional Items Score: 0  Dizziness Handicap Inventory - Total Score: 6       Referring provider co-signature:  I have reviewed this plan of care and my co-signature certifies the need for services.    Certification Period: 09/07/2023 to  11/02/23    Physician Signature: ________________________________ Date: ______________

## 2023-09-07 ENCOUNTER — PHYSICAL THERAPY (OUTPATIENT)
Dept: PHYSICAL THERAPY | Facility: MEDICAL CENTER | Age: 58
End: 2023-09-07
Attending: OTOLARYNGOLOGY
Payer: MEDICAID

## 2023-09-07 DIAGNOSIS — H92.02 OTALGIA, LEFT EAR: ICD-10-CM

## 2023-09-07 PROCEDURE — 97162 PT EVAL MOD COMPLEX 30 MIN: CPT

## 2023-09-07 PROCEDURE — 97140 MANUAL THERAPY 1/> REGIONS: CPT

## 2023-09-07 ASSESSMENT — ENCOUNTER SYMPTOMS
PAIN SCALE AT HIGHEST: 0
PAIN SCALE AT LOWEST: 0
PAIN SCALE: 0

## 2023-09-14 ENCOUNTER — PHYSICAL THERAPY (OUTPATIENT)
Dept: PHYSICAL THERAPY | Facility: MEDICAL CENTER | Age: 58
End: 2023-09-14
Attending: OTOLARYNGOLOGY
Payer: MEDICAID

## 2023-09-14 DIAGNOSIS — H92.02 OTALGIA, LEFT EAR: ICD-10-CM

## 2023-09-14 PROCEDURE — 97140 MANUAL THERAPY 1/> REGIONS: CPT

## 2023-09-14 PROCEDURE — 97110 THERAPEUTIC EXERCISES: CPT

## 2023-09-14 NOTE — OP THERAPY DAILY TREATMENT
"  Outpatient Physical Therapy  DAILY TREATMENT     [facility] Outpatient Physical Therapy  [address]  [place] [address]  Phone:  [phone]  Fax:  [phone]    Date: 09/14/2023    Patient: Luanne Pizano  YOB: 1965  MRN: [id number]     Time Calculation    Start time: 0800  Stop time: 0840 Time Calculation (min): 40 minutes         Chief Complaint: Neck Problem    Visit #: 2    SUBJECTIVE:  I've been doing my self massage most days. Not noticing a difference. Did have one day that I was feeling dizzy/imbalanced most of the morning.     OBJECTIVE:      Therapeutic Exercises (CPT 98777):     1. TMJ AROM, opening, deviation. x 15 ea    2. Chin tucks supine, x 20    3. chin tuck with rotation, x 20 ea    4. chin tuck and retraction, 5\" x 20    5. DNF endurance with neutral TMJ, 2x30\", mild pain reproduced in the L ear.    6. foam roller, OA nod, alt GHJ flexion, angels, SOR on top edge      Therapeutic Exercise Summary:   Access Code: [email or web address]  URL: [email or web address]  Date: 09/14/2023  Prepared by:     Exercises  - [place] on Foam Roll   - Thoracic Foam Roll Mobilization Backstroke   - SOR on roller    Therapeutic Treatments and Modalities:     1. Manual Therapy (CPT 94995), Supine: DTW to B masseters, sub occ.  and UPAs throughout the c/s, focuing on the upper levels. L TMJ gapping. All mobs GIII-IV.    Time-based treatments/modalities:    Physical Therapy Timed Treatment Charges  Manual therapy minutes (CPT 92349): 10 minutes  Therapeutic exercise minutes (CPT 17808): 30 minutes    ASSESSMENT:   Response to treatment: Little tenderness/restriction palpable through the SOR and TMJ, though sx are reproduced during DNF endurance testing. Prognosis is guarded, as this may not be musculoskeletal in nature, but trial of therapy is indicated. Receptive to postural cues. Plans to purchase a foam roller. Continue to build DNF endurance. "     PLAN/RECOMMENDATIONS:   Plan for treatment: therapy treatment to continue next visit.  Planned interventions for next visit: continue with current treatment.

## 2023-09-20 NOTE — OP THERAPY DAILY TREATMENT
"  Outpatient Physical Therapy  DAILY TREATMENT     [facility] Outpatient Physical Therapy  [address]  [place] [address]  Phone:  [phone]  Fax:  [phone]    Date: 09/21/2023    Patient: Luanne Pizano  YOB: 1965  MRN: [id number]     Time Calculation    Start time: 1100  Stop time: 1138 Time Calculation (min): 38 minutes         Chief Complaint: No chief complaint on file.    Visit #: 3    SUBJECTIVE:  We've had some progress. Some time on Saturday morning it seemed like my ear popped and cleared about 50%. Unfortunately, it did return to baseline later that day, but is encouraged by the change.     OBJECTIVE:      Therapeutic Exercises (CPT 77846):     1. TMJ AROM, opening, projection, deviation. x 15 ea    2. Chin tucks supine, x 20    3. chin tuck with rotation, x 20 ea    4. chin tuck and side bend, 5\" x 20    5. DNF endurance with neutral TMJ, 2x30\"    6. foam roller, OA nod, alt GHJ flexion, angels, SOR on top edge      Therapeutic Exercise Summary:   Access Code: [email or web address]  URL: [email or web address]  Date: 09/14/2023  Prepared by:     Exercises  - Snow [place] on Foam Roll   - Thoracic Foam Roll Mobilization Backstroke   - SOR on roller    Therapeutic Treatments and Modalities:     1. Manual Therapy (CPT 36329), Supine: DTW to B masseters, sub occ.  and UPAs throughout the c/s, focuing on the upper levels. L TMJ gapping. All mobs GIII-IV.    Time-based treatments/modalities:    Physical Therapy Timed Treatment Charges  Manual therapy minutes (CPT 92080): 15 minutes  Therapeutic exercise minutes (CPT 10946): 23 minutes    ASSESSMENT:   Response to treatment: Pt reporting the L ear clearing to 75% after manual+therex. Had just improved her NM control of deviation. Will continue to utilize OA/TMJ ROM and self SOR to attempt self clearing at home.     PLAN/RECOMMENDATIONS:   Plan for treatment: therapy treatment to continue next " visit.  Planned interventions for next visit: continue with current treatment.

## 2023-09-21 ENCOUNTER — PHYSICAL THERAPY (OUTPATIENT)
Dept: PHYSICAL THERAPY | Facility: MEDICAL CENTER | Age: 58
End: 2023-09-21
Attending: OTOLARYNGOLOGY
Payer: MEDICAID

## 2023-09-21 DIAGNOSIS — H92.02 OTALGIA, LEFT EAR: ICD-10-CM

## 2023-09-21 PROCEDURE — 97140 MANUAL THERAPY 1/> REGIONS: CPT

## 2023-09-21 PROCEDURE — 97110 THERAPEUTIC EXERCISES: CPT

## 2023-09-28 ENCOUNTER — PHYSICAL THERAPY (OUTPATIENT)
Dept: PHYSICAL THERAPY | Facility: MEDICAL CENTER | Age: 58
End: 2023-09-28
Attending: OTOLARYNGOLOGY
Payer: MEDICAID

## 2023-09-28 DIAGNOSIS — H92.02 OTALGIA, LEFT EAR: ICD-10-CM

## 2023-09-28 PROCEDURE — 97110 THERAPEUTIC EXERCISES: CPT

## 2023-09-28 PROCEDURE — 97140 MANUAL THERAPY 1/> REGIONS: CPT

## 2023-09-28 NOTE — OP THERAPY DAILY TREATMENT
"  Outpatient Physical Therapy  DAILY TREATMENT     [facility] Outpatient Physical Therapy  [address]  [place] [address]  Phone:  [phone]  Fax:  [phone]    Date: 09/28/2023    Patient: Luanne Pizano  YOB: 1965  MRN: [id number]     Time Calculation    Start time: 1103  Stop time: 1141 Time Calculation (min): 38 minutes         Chief Complaint: Neck Problem    Visit #: 4    SUBJECTIVE:  My ear has been popping more often, but always short lasting.     OBJECTIVE:      Therapeutic Exercises (CPT 54074):     1. foam roller, OA nod, alt GHJ flexion, angels, SOR on top edge    2. TMJ AROM, opening, projection, deviation. x 15 ea    3. prone retraction with c/s rotation, x 10 ea    4. prone superman, 2x30\"    5. DNF endurance with neutral TMJ, 2x30\"      Therapeutic Exercise Summary:   Access Code: [email or web address]  URL: [email or web address]  Date: 09/14/2023  Prepared by:     Exercises  - Snow [place] on Foam Roll   - Thoracic Foam Roll Mobilization Backstroke   - SOR on roller    Therapeutic Treatments and Modalities:     1. Manual Therapy (CPT 17345), Supine: DTW to B masseters, sub occ.  and UPAs throughout the c/s, focuing on the upper levels. L TMJ gapping. All mobs GIII-IV.    Time-based treatments/modalities:    Physical Therapy Timed Treatment Charges  Manual therapy minutes (CPT 93596): 8 minutes  Therapeutic exercise minutes (CPT 90499): 30 minutes    ASSESSMENT:   Response to treatment: OA and TMJ range is normal. Improved control of jaw deviation. Some opportunity to further develop postural strength. Will be testing diving next week to determine if she is better able to clear her ears. Nearing discharge.     PLAN/RECOMMENDATIONS:   Plan for treatment: therapy treatment to continue next visit.  Planned interventions for next visit: continue with current treatment.         "

## 2023-10-12 ENCOUNTER — PHYSICAL THERAPY (OUTPATIENT)
Dept: PHYSICAL THERAPY | Facility: MEDICAL CENTER | Age: 58
End: 2023-10-12
Attending: OTOLARYNGOLOGY
Payer: MEDICAID

## 2023-10-12 DIAGNOSIS — H92.02 OTALGIA, LEFT EAR: ICD-10-CM

## 2023-10-12 PROCEDURE — 97110 THERAPEUTIC EXERCISES: CPT

## 2023-10-12 PROCEDURE — 97140 MANUAL THERAPY 1/> REGIONS: CPT

## 2023-10-12 NOTE — OP THERAPY DAILY TREATMENT
"  Outpatient Physical Therapy  DAILY TREATMENT     [facility] Outpatient Physical Therapy  [address]  [place] [address]  Phone:  [phone]  Fax:  [phone]    Date: 10/12/2023    Patient: Luanne Pizano  YOB: 1965  MRN: [id number]     Time Calculation    Start time: 0847  Stop time: 0926 Time Calculation (min): 39 minutes         Chief Complaint: No chief complaint on file.    Visit #: 5    SUBJECTIVE:  I did well diving in the pool. No worse now that before I went in. The L ear is clearing more often, but doesn't last long. Does clear easily if she pulls back on the ear, different than before.     OBJECTIVE:      Therapeutic Exercises (CPT 96928):     1. foam roller, OA nod, alt GHJ flexion, angels, SOR on top edge    2. TMJ AROM, opening, projection, deviation. x 15 ea in seated flexion then extension    3. prone retraction with c/s rotation, x 10 ea    4. prone superman, 2x30\"    5. DNF endurance with neutral TMJ, 2x30\"      Therapeutic Exercise Summary:   Access Code: [email or web address]  URL: [email or web address]  Date: 09/14/2023  Prepared by:     Exercises  - Snow [place] on Foam Roll   - Thoracic Foam Roll Mobilization Backstroke   - SOR on roller    Therapeutic Treatments and Modalities:     1. Manual Therapy (CPT 90038), Supine: DTW to B masseters, sub occ.  and UPAs throughout the c/s, focuing on the upper levels. L TMJ gapping. All mobs GIII-IV.    Time-based treatments/modalities:    Physical Therapy Timed Treatment Charges  Manual therapy minutes (CPT 33648): 10 minutes  Therapeutic exercise minutes (CPT 74168): 29 minutes    ASSESSMENT:   Response to treatment: C/S and TMJ ROM is full. Better NM control of deviation. Self clearing more often, but fullness persists. Will have the funds to purchase a foam roller this week. Nearing d/c, but wants to wait until she has completed an open water dive (this weekend). 1 more follow up next " week with d/c expected.     PLAN/RECOMMENDATIONS:   Plan for treatment: therapy treatment to continue next visit.  Planned interventions for next visit: continue with current treatment.

## 2023-10-16 ENCOUNTER — PHYSICAL THERAPY (OUTPATIENT)
Dept: PHYSICAL THERAPY | Facility: MEDICAL CENTER | Age: 58
End: 2023-10-16
Attending: OTOLARYNGOLOGY
Payer: MEDICAID

## 2023-10-16 DIAGNOSIS — H92.02 OTALGIA, LEFT EAR: ICD-10-CM

## 2023-10-16 PROCEDURE — 97110 THERAPEUTIC EXERCISES: CPT

## 2023-10-16 PROCEDURE — 97140 MANUAL THERAPY 1/> REGIONS: CPT

## 2023-10-16 NOTE — OP THERAPY DISCHARGE SUMMARY
Outpatient Physical Therapy  DISCHARGE SUMMARY NOTE      [facility] Outpatient Physical Therapy  [address]  [facility] [address]  Phone:  [phone]  Fax:  [phone]    Date of Visit: 10/16/2023    Patient: Luanne Pizano  YOB: 1965  MRN: [id number]     Referring Provider: Олег Edward M.D.  [address] [place] [id number]   Referring Diagnosis Otalgia, left ear [H92.02]         Functional Assessment Used        Your patient is being discharged from Physical Therapy with the following comments:   Goals partially met  Progress plateau    Comments:  Luanne was seen for 6 PT sessions treating her L ear fullness that was felt to be in part related to L TMJ dysfunction. Treatment involved manual therapy and progressive therex aimed to restore full AROM and postural strength. Luanne now demonstrates full cervical and TMJ ROM, good NM control of all patterns including jaw deviation which was challenging initially. She is reporting her ears to clear more frequently, but only lasting for a few minutes. She is able to clear the ears manually by drawing back on the skin behind the ear and is now nearly complete with her diving class. We have not had success in generating any prolonged relief and therefore further skilled services are expected to provide little benefit. Recommending discharge to [facility]. Welcomed to return with a new referral if indicated.     Jaida Kaye, PT, DPT    Date: 10/16/2023

## 2023-10-16 NOTE — OP THERAPY DAILY TREATMENT
"  Outpatient Physical Therapy  DAILY TREATMENT     [facility] Outpatient Physical Therapy  [address]  [place] [address]  Phone:  [phone]  Fax:  [phone]    Date: 10/16/2023    Patient: Luanne Pizano  YOB: 1965  MRN: [id number]     Time Calculation    Start time: 0845  Stop time: 0923 Time Calculation (min): 38 minutes         Chief Complaint: No chief complaint on file.    Visit #: 6    SUBJECTIVE:  Returns after diving open water this weekend. States she made it through 3 of the 4 dives. Unable to complete the final dive due to full body fatigue and ear pressure. Understands this is likely related to inflammation in the ears/eustachian tube dysfunction. Is comfortable with her HEP and ready for d/c     OBJECTIVE:      Therapeutic Exercises (CPT 45969):     1. foam roller, OA nod, alt GHJ flexion, angels, SOR on top edge    2. TMJ AROM, assessed, full range    3. c/s AROM in all planes, assessed, full range    4. prone superman, 2x30\"    5. Self TP release, reviewed theracane and lacrosse ball x 5 min. HO provided.      Therapeutic Exercise Summary:   Access Code: [email or web address]  URL: [email or web address]  Date: 09/14/2023  Prepared by:     Exercises  - [place] on Foam Roll   - Thoracic Foam Roll Mobilization Backstroke   - SOR on roller    Therapeutic Treatments and Modalities:     1. Manual Therapy (CPT 70576), Supine: DTW to B masseters, sub occ.  and UPAs throughout the c/s, focuing on the upper levels. L TMJ gapping. All mobs GIII-IV.    Time-based treatments/modalities:    Physical Therapy Timed Treatment Charges  Manual therapy minutes (CPT 35437): 30 minutes  Therapeutic exercise minutes (CPT 59886): 13 minutes      ASSESSMENT:   Response to treatment: See d/c note    PLAN/RECOMMENDATIONS:   Plan for treatment: discharge.        "

## 2024-01-17 ENCOUNTER — HOSPITAL ENCOUNTER (OUTPATIENT)
Dept: RADIOLOGY | Facility: MEDICAL CENTER | Age: 59
End: 2024-01-17
Attending: INTERNAL MEDICINE
Payer: MEDICAID

## 2024-01-17 DIAGNOSIS — K21.9 GASTROESOPHAGEAL REFLUX DISEASE, UNSPECIFIED WHETHER ESOPHAGITIS PRESENT: ICD-10-CM

## 2024-01-17 DIAGNOSIS — K90.0 CELIAC DISEASE: ICD-10-CM

## 2024-01-17 DIAGNOSIS — G60.9 IDIOPATHIC PERIPHERAL NEUROPATHY: ICD-10-CM

## 2024-01-17 DIAGNOSIS — K52.832 LYMPHOCYTIC-PLASMACYTIC COLITIS: ICD-10-CM

## 2024-01-17 DIAGNOSIS — R14.0 BLOATING SYMPTOM: ICD-10-CM

## 2024-01-17 DIAGNOSIS — K58.0 IRRITABLE BOWEL SYNDROME WITH DIARRHEA: ICD-10-CM

## 2024-01-17 DIAGNOSIS — K75.4 AUTOIMMUNE HEPATITIS (HCC): ICD-10-CM

## 2024-01-17 PROCEDURE — 76981 USE PARENCHYMA: CPT
